# Patient Record
Sex: MALE | Race: WHITE | Employment: FULL TIME | URBAN - METROPOLITAN AREA
[De-identification: names, ages, dates, MRNs, and addresses within clinical notes are randomized per-mention and may not be internally consistent; named-entity substitution may affect disease eponyms.]

---

## 2017-11-11 NOTE — PROGRESS NOTES
HPI:   Patient presents with: Anxiety  Derm Problem: feet odor      Karla Hyman is a 34year old male who presents for anxiety    Mood is worse.   He complains of: Persistent symptoms for years, since a child/teenager, getting worse and patient feelin No vision change or complaints  EARS:  No hearing loss, no ear pain  MOUTH/THROAT:  No sore throat, no oral lesions  HEART:  No chest pain or palpitations  LUNG:  No SOB, cough or wheeze  GI:  No abdominal pain.   No N/V/D/C  :  No dysuria  MS:  No joint day if indicated by mood  Pt does not feel the need for counseling at this time. Follow up in: 1-2 months or sooner as needed    Additional Assessment and Plan:  1. Anxiety  See above  - Sertraline HCl 25 MG Oral Tab;  One tab po qdx 2-3 week sthen 2 tabs

## 2018-01-21 DIAGNOSIS — F41.9 ANXIETY: ICD-10-CM

## 2018-01-22 ENCOUNTER — TELEPHONE (OUTPATIENT)
Dept: FAMILY MEDICINE CLINIC | Facility: CLINIC | Age: 33
End: 2018-01-22

## 2018-01-22 RX ORDER — SERTRALINE HYDROCHLORIDE 25 MG/1
50 TABLET, FILM COATED ORAL DAILY
Qty: 60 TABLET | Refills: 0 | Status: SHIPPED | OUTPATIENT
Start: 2018-01-22 | End: 2018-02-06 | Stop reason: DRUGHIGH

## 2018-02-04 PROBLEM — F41.9 ANXIETY: Status: ACTIVE | Noted: 2018-02-04

## 2018-02-07 NOTE — PROGRESS NOTES
HPI:   Patient presents with:  Depression  Anxiety  Musculoskeletal Problem      Sorin Lea is a 34year old male who presents for anxiety and depression    Mood is better.   Although patient feels that this is due to less stress at work and home and Smokeless tobacco: Former User                        Types: Chew  Alcohol use: Yes           0.0 oz/week     Comment: social        REVIEW OF SYSTEMS:   Pertinent positives and negatives noted in the the HPI.  Specifically:  GEN:  No fever  HEAD:  No h history of treated anxiety and depression  Mood is better. He does complain of persistent anxiety and depressed mood still  He is doing reasonably well on the current treatment regimen.   Patient feels his anxiety/depression is improving and controlled rajesh

## 2018-02-07 NOTE — PATIENT INSTRUCTIONS
Neck Exercises: Active Neck Rotation    To start, lie on your back, knees bent and feet flat on the floor. Keep your ears, shoulders, and hips aligned, but don’t press your lower back to the floor. Rest your hands on your pelvis.  Breathe deeply and relax Reach overhead and slightly back with both arms. Keep your shoulders and neck aligned and your elbows behind your shoulders:  · With your palms facing the ceiling, turn your fingers inward. · Take a deep breath.  Breathe out, and lower your elbows toward y

## 2018-05-14 RX ORDER — SERTRALINE HYDROCHLORIDE 100 MG/1
100 TABLET, FILM COATED ORAL DAILY
Qty: 90 TABLET | Refills: 0 | Status: SHIPPED | OUTPATIENT
Start: 2018-05-14 | End: 2018-09-08

## 2018-05-14 NOTE — TELEPHONE ENCOUNTER
Pt is requesting a refill for:    Sertraline  MG oral tab    Si tablet by mouth daily  Disp: 90 tablets    Last refill 2018  LOV 2018  Follow-up 1-2 months from LOV      Pt has an appt on 18

## 2018-06-12 PROBLEM — F41.1 GENERALIZED ANXIETY DISORDER: Status: ACTIVE | Noted: 2018-06-12

## 2018-06-12 PROBLEM — F34.1 DYSTHYMIA: Status: ACTIVE | Noted: 2018-06-12

## 2018-06-12 PROBLEM — F41.9 ANXIETY: Status: RESOLVED | Noted: 2018-02-04 | Resolved: 2018-06-12

## 2018-09-19 RX ORDER — SERTRALINE HYDROCHLORIDE 100 MG/1
100 TABLET, FILM COATED ORAL DAILY
Qty: 30 TABLET | Refills: 0 | Status: SHIPPED | OUTPATIENT
Start: 2018-09-19 | End: 2022-12-20

## 2020-02-03 ENCOUNTER — TELEPHONE (OUTPATIENT)
Dept: FAMILY MEDICINE CLINIC | Facility: CLINIC | Age: 35
End: 2020-02-03

## 2020-02-03 DIAGNOSIS — Z20.828 EXPOSURE TO INFLUENZA: Primary | ICD-10-CM

## 2020-02-03 RX ORDER — OSELTAMIVIR PHOSPHATE 75 MG/1
75 CAPSULE ORAL DAILY
Qty: 10 CAPSULE | Refills: 0 | Status: SHIPPED | OUTPATIENT
Start: 2020-02-03

## 2020-02-03 NOTE — TELEPHONE ENCOUNTER
Patient's son positive for influenza B today. Will give prophylactic Tamiflu per wife's request. Side effects discussed. To report flu symptoms.

## 2023-03-18 DIAGNOSIS — F41.1 GENERALIZED ANXIETY DISORDER: ICD-10-CM

## 2023-03-18 RX ORDER — ESCITALOPRAM OXALATE 10 MG/1
TABLET ORAL
Qty: 90 TABLET | Refills: 0 | Status: SHIPPED | OUTPATIENT
Start: 2023-03-18

## 2023-03-18 NOTE — TELEPHONE ENCOUNTER
A refill request was received for:  Requested Prescriptions     Pending Prescriptions Disp Refills    ESCITALOPRAM 10 MG Oral Tab [Pharmacy Med Name: ESCITALOPRAM 10 MG TABLET] 90 tablet 0     Sig: TAKE 1 TABLET BY MOUTH EVERY DAY     Last refill date:  12/20/2022   Qty: 90  Dx: AKHIL  Last office visit: 12/20/2022 telemedicine   When is follow up due: not stated     No future appointments.

## 2023-06-15 DIAGNOSIS — F41.1 GENERALIZED ANXIETY DISORDER: ICD-10-CM

## 2023-06-15 RX ORDER — ESCITALOPRAM OXALATE 10 MG/1
TABLET ORAL
Qty: 90 TABLET | Refills: 0 | OUTPATIENT
Start: 2023-06-15

## 2023-07-09 ENCOUNTER — APPOINTMENT (OUTPATIENT)
Dept: GENERAL RADIOLOGY | Age: 38
End: 2023-07-09
Attending: PHYSICIAN ASSISTANT
Payer: COMMERCIAL

## 2023-07-09 ENCOUNTER — HOSPITAL ENCOUNTER (OUTPATIENT)
Age: 38
Discharge: HOME | End: 2023-07-09
Payer: COMMERCIAL

## 2023-07-09 VITALS
HEART RATE: 79 BPM | HEIGHT: 69 IN | DIASTOLIC BLOOD PRESSURE: 67 MMHG | OXYGEN SATURATION: 100 % | SYSTOLIC BLOOD PRESSURE: 112 MMHG | TEMPERATURE: 98 F | RESPIRATION RATE: 18 BRPM | BODY MASS INDEX: 23.7 KG/M2 | WEIGHT: 160 LBS

## 2023-07-09 DIAGNOSIS — M85.60 SUBCHONDRAL CYST: ICD-10-CM

## 2023-07-09 DIAGNOSIS — M79.674 PAIN OF TOE OF RIGHT FOOT: ICD-10-CM

## 2023-07-09 DIAGNOSIS — M19.079 PRIMARY OSTEOARTHRITIS OF FIRST METATARSOPHALANGEAL (MTP) JOINT: Primary | ICD-10-CM

## 2023-07-09 PROCEDURE — 99203 OFFICE O/P NEW LOW 30 MIN: CPT | Performed by: PHYSICIAN ASSISTANT

## 2023-07-09 PROCEDURE — 73630 X-RAY EXAM OF FOOT: CPT | Performed by: PHYSICIAN ASSISTANT

## 2023-07-09 NOTE — ED INITIAL ASSESSMENT (HPI)
Pt presents to the IC with c/o right great toe pain at the knuckle. Pt cannot remember an injury. Symptoms have been going on for a week. Pt also notes restless leg syndrome for the last month, coupled with anxiety.

## 2023-08-09 ENCOUNTER — OFFICE VISIT (OUTPATIENT)
Dept: PODIATRY CLINIC | Facility: CLINIC | Age: 38
End: 2023-08-09

## 2023-08-09 VITALS — SYSTOLIC BLOOD PRESSURE: 128 MMHG | DIASTOLIC BLOOD PRESSURE: 66 MMHG

## 2023-08-09 DIAGNOSIS — M77.50 CAPSULITIS OF METATARSOPHALANGEAL (MTP) JOINT: ICD-10-CM

## 2023-08-09 DIAGNOSIS — M19.079 ARTHRITIS OF GREAT TOE AT METATARSOPHALANGEAL JOINT: ICD-10-CM

## 2023-08-09 DIAGNOSIS — M20.5X1 HALLUX LIMITUS OF RIGHT FOOT: Primary | ICD-10-CM

## 2023-08-09 PROCEDURE — 99203 OFFICE O/P NEW LOW 30 MIN: CPT | Performed by: PODIATRIST

## 2023-08-09 PROCEDURE — 3078F DIAST BP <80 MM HG: CPT | Performed by: PODIATRIST

## 2023-08-09 PROCEDURE — 3074F SYST BP LT 130 MM HG: CPT | Performed by: PODIATRIST

## 2023-08-09 PROCEDURE — 20600 DRAIN/INJ JOINT/BURSA W/O US: CPT | Performed by: PODIATRIST

## 2023-08-09 RX ORDER — TRIAMCINOLONE ACETONIDE 40 MG/ML
20 INJECTION, SUSPENSION INTRA-ARTICULAR; INTRAMUSCULAR ONCE
Status: COMPLETED | OUTPATIENT
Start: 2023-08-09 | End: 2023-08-09

## 2023-08-09 NOTE — PROGRESS NOTES
Per Dr. Melvin Cordova draw up 0.5ml of 0.5% Marcaine and 0.5ml of Kenalog 40 for injection to right foot.

## 2023-08-09 NOTE — PROGRESS NOTES
Molly Davila is a 44year old male. Patient presents with:  Consult: Arthritis right hallux - pain 2/10 was seen at [facility] on 7/9. HPI:   This pleasant gentleman presents to the clinic with a chief complaint of a painful right great toe joint that started on July 4. He has gotten stiffer soled shoes which helped quite a bit but is problem bothers him mostly when he tries to bowl. He has tried orthotics and inserts but he still experiences pain at that time he points to his right great toe joint. At today's visit reviewed nurse's history as taken above, allergies medications and medical history as documented below. All changes duly noted  Allergies: Patient has no known allergies. Current Outpatient Medications   Medication Sig Dispense Refill    ESCITALOPRAM 10 MG Oral Tab TAKE 1 TABLET BY MOUTH EVERY DAY 90 tablet 0      History reviewed. No pertinent past medical history. History reviewed. No pertinent surgical history. Family History   Problem Relation Age of Onset    Diabetes Mother       Social History    Socioeconomic History      Marital status:     Tobacco Use      Smoking status: Never      Smokeless tobacco: Former        Types: Chew    Substance and Sexual Activity      Alcohol use: Yes        Alcohol/week: 0.0 standard drinks of alcohol        Comment: social      Drug use: No          REVIEW OF SYSTEMS:   Today reviewed systens as documented below  GENERAL HEALTH: feels well otherwise  SKIN: Refer to exam below  RESPIRATORY: denies shortness of breath with exertion  CARDIOVASCULAR: denies chest pain on exertion  GI: denies abdominal pain and denies heartburn  NEURO: denies headaches    EXAM:   /66 (BP Location: Left arm, Patient Position: Sitting, Cuff Size: adult)   GENERAL: well developed, well nourished, in no apparent distress  EXTREMITIES:   1. Integument: The skin on his right foot is warm and dry.   He will hair growth is present he has some mild swelling around the first MTP joint of the right foot   2. Vascular: Patient has palpable pulses dorsalis pedis posterior tibial on the right   3. Neurologic: Patient has intact sensorium   4. Musculoskeletal: Patient has pain on palpation and range of motion of the first MTP joint on the right foot there is some end range of motion crepitation but no severe mechanical grinding is noted  I reviewed the x-rays that were taken on 7/9/2023 which show some osteoarthritic changes. I did review them they look relatively minor. 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 Today discussed the nature and extent of a cortisone injection as well as the possible complications and risks. Patient was in agreement to receive the injection. The patient was consented for a cortisone injection and after timeout was taken the injection consisting of 20 mg Kenalog and 0.5 cc of 0.5% Marcaine plain was injected into the symptomatic first metatarsal phalangeal joint, right foot using aseptic technique and appropriate approach. A Band-Aid was applied to the injection site. Follow-up in about 3 weeks dispense diagnosis codes to the patient so he can call his insurance to see if custom orthotics are covered. The patient indicates understanding of these issues and agrees to the plan.     Jesus Pineda DPM

## 2024-03-06 ENCOUNTER — OFFICE VISIT (OUTPATIENT)
Dept: FAMILY MEDICINE CLINIC | Facility: CLINIC | Age: 39
End: 2024-03-06

## 2024-03-06 VITALS
HEIGHT: 69 IN | WEIGHT: 167 LBS | BODY MASS INDEX: 24.73 KG/M2 | SYSTOLIC BLOOD PRESSURE: 104 MMHG | TEMPERATURE: 98 F | DIASTOLIC BLOOD PRESSURE: 64 MMHG | HEART RATE: 58 BPM | RESPIRATION RATE: 16 BRPM

## 2024-03-06 DIAGNOSIS — R10.13 DYSPEPSIA: ICD-10-CM

## 2024-03-06 DIAGNOSIS — F41.1 GENERALIZED ANXIETY DISORDER: ICD-10-CM

## 2024-03-06 DIAGNOSIS — K52.9 GASTROENTERITIS: ICD-10-CM

## 2024-03-06 DIAGNOSIS — R35.0 FREQUENT URINATION: Primary | ICD-10-CM

## 2024-03-06 LAB
GLUCOSE (URINE DIPSTICK): NEGATIVE MG/DL
KETONES (URINE DIPSTICK): NEGATIVE MG/DL
LEUKOCYTES: NEGATIVE
MULTISTIX LOT#: ABNORMAL NUMERIC
NITRITE, URINE: NEGATIVE
PH, URINE: 7 (ref 4.5–8)
PROTEIN (URINE DIPSTICK): 30 MG/DL
SPECIFIC GRAVITY: 1.01 (ref 1–1.03)
UROBILINOGEN,SEMI-QN: 0.2 MG/DL (ref 0–1.9)

## 2024-03-06 PROCEDURE — 99203 OFFICE O/P NEW LOW 30 MIN: CPT | Performed by: FAMILY MEDICINE

## 2024-03-06 PROCEDURE — 81002 URINALYSIS NONAUTO W/O SCOPE: CPT | Performed by: FAMILY MEDICINE

## 2024-03-06 RX ORDER — OMEPRAZOLE 40 MG/1
40 CAPSULE, DELAYED RELEASE ORAL DAILY
Qty: 30 CAPSULE | Refills: 2 | Status: SHIPPED | OUTPATIENT
Start: 2024-03-06

## 2024-03-06 NOTE — PROGRESS NOTES
Subjective:   Patient ID: Gelnroy Perez is a 38 year old male.    Pt presents with hx nocturia over the last few months. No dysuria.   Pt states mother passed away and has been having a lot of stress and made his anxiety worse.   Also has had some nausea, vomiting, diarrhea, heartburn over the last week.   No further vomiting/ diarrhea. No melena of blood in the stool. Kids had a stomach bug recently.   Pt thought he might have had some blood with episode of vomiting and also an episode of incontinence.   Pt also has had sig stressors in life. Previous MD tried lexapro for anxiety but this did not help.   Offered consult with behavioral health for therapy and pt will think about this.        History/Other:   Review of Systems   Constitutional:  Negative for fever.   Gastrointestinal:  Positive for abdominal pain, diarrhea, nausea and vomiting.   Genitourinary:  Positive for frequency. Negative for difficulty urinating, dysuria and hematuria.   Psychiatric/Behavioral:  The patient is nervous/anxious.      Current Outpatient Medications   Medication Sig Dispense Refill    Omeprazole 40 MG Oral Capsule Delayed Release Take 1 capsule (40 mg total) by mouth daily. 30 capsule 2    ESCITALOPRAM 10 MG Oral Tab TAKE 1 TABLET BY MOUTH EVERY DAY (Patient not taking: Reported on 3/6/2024) 90 tablet 0     Allergies:No Known Allergies    Objective:   Physical Exam  Constitutional:       Appearance: Normal appearance.   Cardiovascular:      Rate and Rhythm: Normal rate and regular rhythm.      Pulses: Normal pulses.      Heart sounds: Normal heart sounds.   Pulmonary:      Effort: Pulmonary effort is normal.      Breath sounds: Normal breath sounds.   Abdominal:      General: Abdomen is flat. Bowel sounds are normal. There is no distension.      Tenderness: There is no abdominal tenderness. There is no right CVA tenderness, left CVA tenderness, guarding or rebound.   Neurological:      Mental Status: He is alert.   Psychiatric:          Mood and Affect: Mood normal.         Behavior: Behavior normal.         Thought Content: Thought content normal.         Assessment & Plan:   1. Frequent urination:  - Urinalysis had trace blood. After discussion with patient, will check urine culture and blood work. Follow up and further management after testing. Call if any sig symptoms.     2. Gastroenteritis:  - After discussion with patient/ wife, bland diet discussed; adequate fluids; To call if worse or not better; Follow up as needed.      3. Dyspepsia:  - After discussion with patient, omeprazole as directed; Encouraged bland diet; To call if worse or any sig symptoms; Consider follow up with GI if needed       4. Generalized anxiety disorder:sig stress:  - Offered behavioral health consult. Pt will consider and think about this.       Orders Placed This Encounter   Procedures    URINALYSIS NONAUTO W/O SCOPE    PSA Total, Screen    CBC, Platelet; No Differential    Comp Metabolic Panel (14)    Urine Culture, Routine       Meds This Visit:  Requested Prescriptions     Signed Prescriptions Disp Refills    Omeprazole 40 MG Oral Capsule Delayed Release 30 capsule 2     Sig: Take 1 capsule (40 mg total) by mouth daily.       Imaging & Referrals:  [facility] - INTERNAL

## 2024-03-13 ENCOUNTER — LAB ENCOUNTER (OUTPATIENT)
Dept: LAB | Age: 39
End: 2024-03-13
Attending: FAMILY MEDICINE
Payer: COMMERCIAL

## 2024-03-13 DIAGNOSIS — R35.0 FREQUENT URINATION: ICD-10-CM

## 2024-03-13 LAB
ALBUMIN SERPL-MCNC: 4.7 G/DL (ref 3.2–4.8)
ALBUMIN/GLOB SERPL: 1.5 {RATIO} (ref 1–2)
ALP LIVER SERPL-CCNC: 67 U/L
ALT SERPL-CCNC: 21 U/L
ANION GAP SERPL CALC-SCNC: <0 MMOL/L (ref 0–18)
AST SERPL-CCNC: 22 U/L (ref ?–34)
BILIRUB SERPL-MCNC: 0.4 MG/DL (ref 0.3–1.2)
BUN BLD-MCNC: 20 MG/DL (ref 9–23)
BUN/CREAT SERPL: 19.2 (ref 10–20)
CALCIUM BLD-MCNC: 10.3 MG/DL (ref 8.7–10.4)
CHLORIDE SERPL-SCNC: 113 MMOL/L (ref 98–112)
CO2 SERPL-SCNC: 31 MMOL/L (ref 21–32)
COMPLEXED PSA SERPL-MCNC: 0.59 NG/ML (ref ?–4)
CREAT BLD-MCNC: 1.04 MG/DL
DEPRECATED RDW RBC AUTO: 40.7 FL (ref 35.1–46.3)
EGFRCR SERPLBLD CKD-EPI 2021: 94 ML/MIN/1.73M2 (ref 60–?)
ERYTHROCYTE [DISTWIDTH] IN BLOOD BY AUTOMATED COUNT: 13.1 % (ref 11–15)
FASTING STATUS PATIENT QL REPORTED: YES
GLOBULIN PLAS-MCNC: 3.1 G/DL (ref 2.8–4.4)
GLUCOSE BLD-MCNC: 84 MG/DL (ref 70–99)
HCT VFR BLD AUTO: 43.2 %
HGB BLD-MCNC: 14.7 G/DL
MCH RBC QN AUTO: 29.5 PG (ref 26–34)
MCHC RBC AUTO-ENTMCNC: 34 G/DL (ref 31–37)
MCV RBC AUTO: 86.7 FL
OSMOLALITY SERPL CALC.SUM OF ELEC: 284 MOSM/KG (ref 275–295)
PLATELET # BLD AUTO: 329 10(3)UL (ref 150–450)
POTASSIUM SERPL-SCNC: 3.9 MMOL/L (ref 3.5–5.1)
PROT SERPL-MCNC: 7.8 G/DL (ref 5.7–8.2)
RBC # BLD AUTO: 4.98 X10(6)UL
SODIUM SERPL-SCNC: 136 MMOL/L (ref 136–145)
WBC # BLD AUTO: 8.3 X10(3) UL (ref 4–11)

## 2024-03-13 PROCEDURE — 36415 COLL VENOUS BLD VENIPUNCTURE: CPT

## 2024-03-13 PROCEDURE — 85027 COMPLETE CBC AUTOMATED: CPT

## 2024-03-13 PROCEDURE — 80053 COMPREHEN METABOLIC PANEL: CPT

## (undated) NOTE — LETTER
AUTHORIZATION FOR SURGICAL OPERATION OR OTHER PROCEDURE    1. I hereby authorize Dr. Mason Hernández, and [address] [place] [address] staff assigned to my case to perform the following operation and/or procedure at the [address] [place] [address]:    _______________________________________________________________________________________________    Cortisone Injection Right Foot  _______________________________________________________________________________________________    2. My physician has explained the nature and purpose of the operation or other procedure, possible alternative methods of treatment, the risks involved, and the possibility of complication to me. I acknowledge that no guarantee has been made as to the result that may be obtained. 3.  I recognize that, during the course of this operation, or other procedure, unforseen conditions may necessitate additional or different procedure than those listed above. I, therefore, further authorize and request that the above named physician, his/her physician assistants or designees perform such procedures as are, in his/her professional opinion, necessary and desirable. 4.  Any tissue or organs removed in the operation or other procedure may be disposed of by and at the discretion of the [address] [place] [address] and [facility]. 5.  I understand that in the event of a medical emergency, I will be transported by local paramedics to [facility] or other hospital emergency department. 6.  I certify that I have read and fully understand the above consent to operation and/or other procedure. 7.  I acknowledge that my physician has explained sedation/analgesia administration to me including the risks and benefits. I consent to the administration of sedation/analgesia as may be necessary or desirable in the judgement of my physician.     Witness signature: ___________________________________________________ Date:  ______/______/_____ Time:  ________ A. M.  P.M. Patient Name:  ______________________________________________________  (please print)      Patient signature:  ___________________________________________________             Relationship to Patient:           []  Parent    Responsible person                          []  Spouse  In case of minor or                    [] Other  _____________   Incompetent name:  __________________________________________________                               (please print)      _____________      Responsible person  In case of minor or  Incompetent signature:  _______________________________________________    Statement of Physician  My signature below affirms that prior to the time of the procedure, I have explained to the patient and/or his/her guardian, the risks and benefits involved in the proposed treatment and any reasonable alternative to the proposed treatment. I have also explained the risks and benefits involved in the refusal of the proposed treatment and have answered the patient's questions.                         Date:  ______/______/_______  Provider                      Signature:  __________________________________________________________       Time:  ___________ A.M    P.M.